# Patient Record
Sex: FEMALE | Race: WHITE | Employment: PART TIME
[De-identification: names, ages, dates, MRNs, and addresses within clinical notes are randomized per-mention and may not be internally consistent; named-entity substitution may affect disease eponyms.]

---

## 2017-09-07 ENCOUNTER — HOSPITAL ENCOUNTER (OUTPATIENT)
Dept: GENERAL RADIOLOGY | Facility: CLINIC | Age: 52
Discharge: HOME | End: 2017-09-07
Payer: COMMERCIAL

## 2017-09-07 ENCOUNTER — HOSPITAL ENCOUNTER (OUTPATIENT)
Facility: CLINIC | Age: 52
Discharge: HOME | End: 2017-09-07
Payer: COMMERCIAL

## 2017-09-07 DIAGNOSIS — R60.9 SWELLING: ICD-10-CM

## 2017-09-07 DIAGNOSIS — R52 PAIN: ICD-10-CM

## 2017-09-07 PROCEDURE — 73080 X-RAY EXAM OF ELBOW: CPT

## 2018-12-03 ENCOUNTER — HOSPITAL ENCOUNTER (OUTPATIENT)
Age: 53
Setting detail: SPECIMEN
Discharge: HOME | End: 2018-12-03
Payer: COMMERCIAL

## 2018-12-03 LAB
ANION GAP SERPL CALCULATED.3IONS-SCNC: 15 MMOL/L (ref 9–17)
CHLORIDE BLD-SCNC: 94 MMOL/L (ref 98–107)
CO2: 28 MMOL/L (ref 20–31)
GLUCOSE BLD-MCNC: 119 MG/DL (ref 70–99)
POTASSIUM SERPL-SCNC: 2.6 MMOL/L (ref 3.7–5.3)
SODIUM BLD-SCNC: 137 MMOL/L (ref 135–144)

## 2018-12-04 LAB
ESTIMATED AVERAGE GLUCOSE: 100 MG/DL
HBA1C MFR BLD: 5.1 % (ref 4–6)

## 2018-12-05 ENCOUNTER — HOSPITAL ENCOUNTER (OUTPATIENT)
Age: 53
Setting detail: SPECIMEN
Discharge: HOME | End: 2018-12-05
Payer: COMMERCIAL

## 2018-12-05 LAB
POTASSIUM SERPL-SCNC: 3.7 MMOL/L (ref 3.7–5.3)
SODIUM BLD-SCNC: 134 MMOL/L (ref 135–144)

## 2018-12-11 ENCOUNTER — HOSPITAL ENCOUNTER (OUTPATIENT)
Age: 53
Setting detail: SPECIMEN
Discharge: HOME | End: 2018-12-11
Payer: COMMERCIAL

## 2018-12-11 LAB
ANION GAP SERPL CALCULATED.3IONS-SCNC: 15 MMOL/L (ref 9–17)
CHLORIDE BLD-SCNC: 104 MMOL/L (ref 98–107)
CO2: 19 MMOL/L (ref 20–31)
POTASSIUM SERPL-SCNC: 4.5 MMOL/L (ref 3.7–5.3)
SODIUM BLD-SCNC: 138 MMOL/L (ref 135–144)

## 2018-12-13 LAB — VITAMIN D 25-HYDROXY: 18.7 NG/ML (ref 30–100)

## 2018-12-20 ENCOUNTER — HOSPITAL ENCOUNTER (OUTPATIENT)
Age: 53
Setting detail: SPECIMEN
Discharge: HOME | End: 2018-12-20
Payer: COMMERCIAL

## 2018-12-20 LAB
ABSOLUTE EOS #: 0.16 K/UL (ref 0–0.44)
ABSOLUTE IMMATURE GRANULOCYTE: 0.03 K/UL (ref 0–0.3)
ABSOLUTE LYMPH #: 0.96 K/UL (ref 1.1–3.7)
ABSOLUTE MONO #: 0.37 K/UL (ref 0.1–1.2)
BASOPHILS # BLD: 0 % (ref 0–2)
BASOPHILS ABSOLUTE: <0.03 K/UL (ref 0–0.2)
DIFFERENTIAL TYPE: ABNORMAL
EOSINOPHILS RELATIVE PERCENT: 3 % (ref 1–4)
HCT VFR BLD CALC: 34.2 % (ref 36.3–47.1)
HEMOGLOBIN: 11.2 G/DL (ref 11.9–15.1)
IMMATURE GRANULOCYTES: 1 %
LYMPHOCYTES # BLD: 16 % (ref 24–43)
MCH RBC QN AUTO: 29.9 PG (ref 25.2–33.5)
MCHC RBC AUTO-ENTMCNC: 32.7 G/DL (ref 28.4–34.8)
MCV RBC AUTO: 91.2 FL (ref 82.6–102.9)
MONOCYTES # BLD: 6 % (ref 3–12)
NRBC AUTOMATED: 0 PER 100 WBC
PDW BLD-RTO: 15.5 % (ref 11.8–14.4)
PLATELET # BLD: 140 K/UL (ref 138–453)
PLATELET ESTIMATE: ABNORMAL
PMV BLD AUTO: 11.9 FL (ref 8.1–13.5)
RBC # BLD: 3.75 M/UL (ref 3.95–5.11)
RBC # BLD: ABNORMAL 10*6/UL
SEG NEUTROPHILS: 74 % (ref 36–65)
SEGMENTED NEUTROPHILS ABSOLUTE COUNT: 4.48 K/UL (ref 1.5–8.1)
WBC # BLD: 6 K/UL (ref 3.5–11.3)
WBC # BLD: ABNORMAL 10*3/UL

## 2018-12-24 ENCOUNTER — HOSPITAL ENCOUNTER (OUTPATIENT)
Age: 53
Setting detail: SPECIMEN
Discharge: HOME | End: 2018-12-24
Payer: COMMERCIAL

## 2018-12-24 LAB
ABSOLUTE EOS #: 0.16 K/UL (ref 0–0.44)
ABSOLUTE IMMATURE GRANULOCYTE: 0.05 K/UL (ref 0–0.3)
ABSOLUTE LYMPH #: 1.18 K/UL (ref 1.1–3.7)
ABSOLUTE MONO #: 0.53 K/UL (ref 0.1–1.2)
BASOPHILS # BLD: 1 % (ref 0–2)
BASOPHILS ABSOLUTE: 0.06 K/UL (ref 0–0.2)
DIFFERENTIAL TYPE: ABNORMAL
EOSINOPHILS RELATIVE PERCENT: 2 % (ref 1–4)
HCT VFR BLD CALC: 37 % (ref 36.3–47.1)
HEMOGLOBIN: 11.2 G/DL (ref 11.9–15.1)
IMMATURE GRANULOCYTES: 1 %
LYMPHOCYTES # BLD: 17 % (ref 24–43)
MCH RBC QN AUTO: 29.6 PG (ref 25.2–33.5)
MCHC RBC AUTO-ENTMCNC: 30.3 G/DL (ref 28.4–34.8)
MCV RBC AUTO: 97.9 FL (ref 82.6–102.9)
MONOCYTES # BLD: 8 % (ref 3–12)
NRBC AUTOMATED: 0 PER 100 WBC
PDW BLD-RTO: 16 % (ref 11.8–14.4)
PLATELET # BLD: 121 K/UL (ref 138–453)
PLATELET ESTIMATE: ABNORMAL
PMV BLD AUTO: 11.8 FL (ref 8.1–13.5)
RBC # BLD: 3.78 M/UL (ref 3.95–5.11)
RBC # BLD: ABNORMAL 10*6/UL
SEG NEUTROPHILS: 71 % (ref 36–65)
SEGMENTED NEUTROPHILS ABSOLUTE COUNT: 4.94 K/UL (ref 1.5–8.1)
WBC # BLD: 6.9 K/UL (ref 3.5–11.3)
WBC # BLD: ABNORMAL 10*3/UL

## 2019-02-20 ENCOUNTER — HOSPITAL ENCOUNTER (OUTPATIENT)
Age: 54
Setting detail: SPECIMEN
Discharge: HOME | End: 2019-02-20
Payer: COMMERCIAL

## 2019-02-20 LAB
ESTRADIOL LEVEL: <5 PG/ML (ref 5–50)
FOLLICLE STIMULATING HORMONE: 56.8 U/L (ref 25.8–134.8)
LH: 28.1 U/L (ref 7.7–58.5)
T3 TOTAL: 82 NG/DL (ref 80–200)
T4 TOTAL: 9 UG/DL (ref 4.5–12)
TSH SERPL DL<=0.05 MIU/L-ACNC: 3.77 MIU/L (ref 0.3–5)

## 2019-02-24 LAB
ESTRADIOL LEVEL: 4.3 PG/ML
ESTROGEN TOTAL: 23 PG/ML
ESTRONE: 18.7 PG/ML

## 2019-06-17 ENCOUNTER — HOSPITAL ENCOUNTER (OUTPATIENT)
Age: 54
Setting detail: SPECIMEN
Discharge: HOME | End: 2019-06-17
Payer: COMMERCIAL

## 2019-06-17 LAB
CHOLESTEROL/HDL RATIO: 3.9
CHOLESTEROL: 159 MG/DL
HDLC SERPL-MCNC: 41 MG/DL
LDL CHOLESTEROL: 93 MG/DL (ref 0–130)
TRIGL SERPL-MCNC: 126 MG/DL
VLDLC SERPL CALC-MCNC: NORMAL MG/DL (ref 1–30)

## 2024-06-09 ENCOUNTER — HOSPITAL ENCOUNTER (EMERGENCY)
Age: 59
Discharge: HOME | End: 2024-06-09
Attending: EMERGENCY MEDICINE
Payer: COMMERCIAL

## 2024-06-09 ENCOUNTER — APPOINTMENT (OUTPATIENT)
Dept: GENERAL RADIOLOGY | Age: 59
End: 2024-06-09
Payer: COMMERCIAL

## 2024-06-09 VITALS
OXYGEN SATURATION: 97 % | TEMPERATURE: 97.8 F | RESPIRATION RATE: 18 BRPM | HEART RATE: 75 BPM | BODY MASS INDEX: 24.84 KG/M2 | DIASTOLIC BLOOD PRESSURE: 82 MMHG | WEIGHT: 135 LBS | SYSTOLIC BLOOD PRESSURE: 198 MMHG | HEIGHT: 62 IN

## 2024-06-09 DIAGNOSIS — S52.023S CLOSED FRACTURE OF OLECRANON PROCESS OF ULNA, UNSPECIFIED LATERALITY, SEQUELA: Primary | ICD-10-CM

## 2024-06-09 PROCEDURE — 73080 X-RAY EXAM OF ELBOW: CPT

## 2024-06-09 PROCEDURE — 73090 X-RAY EXAM OF FOREARM: CPT

## 2024-06-09 PROCEDURE — 29125 APPL SHORT ARM SPLINT STATIC: CPT

## 2024-06-09 PROCEDURE — 73110 X-RAY EXAM OF WRIST: CPT

## 2024-06-09 PROCEDURE — 99283 EMERGENCY DEPT VISIT LOW MDM: CPT | Performed by: EMERGENCY MEDICINE

## 2024-06-09 RX ORDER — HYDROCODONE BITARTRATE AND ACETAMINOPHEN 5; 325 MG/1; MG/1
1 TABLET ORAL EVERY 8 HOURS PRN
Qty: 9 TABLET | Refills: 0 | Status: ON HOLD | OUTPATIENT
Start: 2024-06-09 | End: 2024-06-12 | Stop reason: HOSPADM

## 2024-06-09 ASSESSMENT — PAIN SCALES - GENERAL: PAINLEVEL_OUTOF10: 6

## 2024-06-09 ASSESSMENT — PAIN - FUNCTIONAL ASSESSMENT: PAIN_FUNCTIONAL_ASSESSMENT: 0-10
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
## 2024-06-10 NOTE — ED NOTES
Message from [address] / telephone  Received: Today  Message Contents   Dom Oropeza [facility] Office Pool             General Message/Vendor Calls     Caller's first and last name: Jann Hart       Reason for call: Pt unable to get shingle shot as she just got first covid vaccine and also wants to have all prescriptions refilled.        Callback required yes/no and why:yes to discuss about shot       Best contact number(s): [phone]       Details to clarify the request:n/a       Lazaro Ramsey Per Dr. Russell he wanted to check patients splint prior to discharge. This was not done due to writer discharging patient before this was able to be done.     Patient did not elope, but  was discharged via wheelchair with all belongings, scripts and discharge paperwork. Follow up appointments and discharge instructions reviewed with pt and care giver. All question answered to satisfaction.  was discharged with affected arm splinted in 90 degree angle and a sling was placed to keep patient in 90 degree.      .Electronically signed by Christin Aguilar RN on 6/9/2024 at 10:00 pm.

## 2024-06-11 ENCOUNTER — OFFICE VISIT (OUTPATIENT)
Age: 59
End: 2024-06-11
Payer: COMMERCIAL

## 2024-06-11 ENCOUNTER — HOSPITAL ENCOUNTER (OUTPATIENT)
Age: 59
Discharge: HOME | End: 2024-06-11
Payer: COMMERCIAL

## 2024-06-11 ENCOUNTER — ANESTHESIA EVENT (OUTPATIENT)
Dept: OPERATING ROOM | Age: 59
End: 2024-06-11
Payer: COMMERCIAL

## 2024-06-11 VITALS — BODY MASS INDEX: 24.84 KG/M2 | WEIGHT: 135 LBS | HEIGHT: 62 IN

## 2024-06-11 DIAGNOSIS — S52.022A OLECRANON FRACTURE, LEFT, CLOSED, INITIAL ENCOUNTER: Primary | ICD-10-CM

## 2024-06-11 DIAGNOSIS — Z01.818 PRE-OP EXAM: ICD-10-CM

## 2024-06-11 DIAGNOSIS — Z01.818 PRE-OP EXAM: Primary | ICD-10-CM

## 2024-06-11 LAB
ANION GAP SERPL CALCULATED.3IONS-SCNC: 13 MMOL/L (ref 9–16)
BASOPHILS # BLD: 0.03 K/UL (ref 0–0.2)
BASOPHILS NFR BLD: 0 % (ref 0–2)
BUN SERPL-MCNC: 11 MG/DL (ref 8–23)
CALCIUM SERPL-MCNC: 8.7 MG/DL (ref 8.6–10.4)
CHLORIDE SERPL-SCNC: 107 MMOL/L (ref 98–107)
CO2 SERPL-SCNC: 21 MMOL/L (ref 20–31)
CREAT SERPL-MCNC: 0.8 MG/DL (ref 0.5–0.9)
EOSINOPHIL # BLD: 0.05 K/UL (ref 0–0.44)
EOSINOPHILS RELATIVE PERCENT: 1 % (ref 1–4)
ERYTHROCYTE [DISTWIDTH] IN BLOOD BY AUTOMATED COUNT: 14.6 % (ref 11.8–14.4)
GFR, ESTIMATED: 84 ML/MIN/1.73M2
GLUCOSE SERPL-MCNC: 99 MG/DL (ref 74–99)
HCT VFR BLD AUTO: 36.1 % (ref 36.3–47.1)
HGB BLD-MCNC: 12.4 G/DL (ref 11.9–15.1)
IMM GRANULOCYTES # BLD AUTO: 0.04 K/UL (ref 0–0.3)
IMM GRANULOCYTES NFR BLD: 1 %
LYMPHOCYTES NFR BLD: 1 K/UL (ref 1.1–3.7)
LYMPHOCYTES RELATIVE PERCENT: 13 % (ref 24–43)
MCH RBC QN AUTO: 30.6 PG (ref 25.2–33.5)
MCHC RBC AUTO-ENTMCNC: 34.3 G/DL (ref 28.4–34.8)
MCV RBC AUTO: 89.1 FL (ref 82.6–102.9)
MONOCYTES NFR BLD: 0.47 K/UL (ref 0.1–1.2)
MONOCYTES NFR BLD: 6 % (ref 3–12)
NEUTROPHILS NFR BLD: 79 % (ref 36–65)
NEUTS SEG NFR BLD: 6.31 K/UL (ref 1.5–8.1)
NRBC BLD-RTO: 0 PER 100 WBC
PLATELET # BLD AUTO: 163 K/UL (ref 138–453)
PMV BLD AUTO: 11.2 FL (ref 8.1–13.5)
POTASSIUM SERPL-SCNC: 3 MMOL/L (ref 3.7–5.3)
RBC # BLD AUTO: 4.05 M/UL (ref 3.95–5.11)
RBC # BLD: ABNORMAL 10*6/UL
SODIUM SERPL-SCNC: 141 MMOL/L (ref 136–145)
WBC OTHER # BLD: 7.9 K/UL (ref 3.5–11.3)

## 2024-06-11 PROCEDURE — 85025 COMPLETE CBC W/AUTO DIFF WBC: CPT

## 2024-06-11 PROCEDURE — 93005 ELECTROCARDIOGRAM TRACING: CPT | Performed by: ORTHOPAEDIC SURGERY

## 2024-06-11 PROCEDURE — 36415 COLL VENOUS BLD VENIPUNCTURE: CPT

## 2024-06-11 PROCEDURE — 80048 BASIC METABOLIC PNL TOTAL CA: CPT

## 2024-06-11 PROCEDURE — 99204 OFFICE O/P NEW MOD 45 MIN: CPT | Performed by: ORTHOPAEDIC SURGERY

## 2024-06-11 RX ORDER — ALPRAZOLAM 0.5 MG/1
TABLET ORAL
Status: ON HOLD | COMMUNITY
End: 2024-06-12 | Stop reason: HOSPADM

## 2024-06-11 RX ORDER — SODIUM CAPRYLATE
POWDER (GRAM) MISCELLANEOUS
COMMUNITY
Start: 2020-04-29

## 2024-06-11 RX ORDER — DOXYCYCLINE HYCLATE 100 MG/1
CAPSULE ORAL
COMMUNITY

## 2024-06-11 RX ORDER — SERTRALINE HYDROCHLORIDE 100 MG/1
TABLET, FILM COATED ORAL
COMMUNITY
Start: 2020-04-29

## 2024-06-11 RX ORDER — ASPIRIN 81 MG/1
1 TABLET ORAL DAILY
COMMUNITY

## 2024-06-11 RX ORDER — ALBUTEROL SULFATE 2.5 MG/3ML
SOLUTION RESPIRATORY (INHALATION)
COMMUNITY
Start: 2019-02-15

## 2024-06-11 RX ORDER — TIZANIDINE 4 MG/1
TABLET ORAL
Status: ON HOLD | COMMUNITY
End: 2024-06-12 | Stop reason: HOSPADM

## 2024-06-11 RX ORDER — LEVOTHYROXINE SODIUM 0.03 MG/1
TABLET ORAL
COMMUNITY

## 2024-06-11 RX ORDER — NIFEDIPINE 10 MG/1
CAPSULE ORAL
COMMUNITY
Start: 2019-10-24

## 2024-06-11 RX ORDER — ATORVASTATIN CALCIUM 80 MG/1
1 TABLET, FILM COATED ORAL DAILY
COMMUNITY
Start: 2019-02-26

## 2024-06-11 RX ORDER — TRAZODONE HYDROCHLORIDE 50 MG/1
TABLET ORAL
Status: ON HOLD | COMMUNITY
End: 2024-06-12 | Stop reason: HOSPADM

## 2024-06-11 RX ORDER — IBUPROFEN 800 MG/1
TABLET ORAL
Status: ON HOLD | COMMUNITY
End: 2024-06-12 | Stop reason: HOSPADM

## 2024-06-11 RX ORDER — MELOXICAM 15 MG/1
TABLET ORAL
Status: ON HOLD | COMMUNITY
End: 2024-06-12 | Stop reason: HOSPADM

## 2024-06-11 RX ORDER — POTASSIUM CHLORIDE 750 MG/1
TABLET, FILM COATED, EXTENDED RELEASE ORAL
COMMUNITY
Start: 2024-05-20

## 2024-06-11 RX ORDER — B-COMPLEX WITH VITAMIN C
50 TABLET ORAL
COMMUNITY
Start: 2020-04-29

## 2024-06-11 RX ORDER — QUETIAPINE FUMARATE 50 MG/1
TABLET, EXTENDED RELEASE ORAL
COMMUNITY
Start: 2021-12-30

## 2024-06-11 RX ORDER — AMLODIPINE BESYLATE 2.5 MG/1
TABLET ORAL
COMMUNITY
Start: 2024-05-23

## 2024-06-11 RX ORDER — HYDROXYCHLOROQUINE SULFATE 200 MG/1
TABLET, FILM COATED ORAL
COMMUNITY
Start: 2019-10-24

## 2024-06-11 RX ORDER — QUETIAPINE FUMARATE 25 MG/1
TABLET, FILM COATED ORAL
COMMUNITY

## 2024-06-11 RX ORDER — HYDROXYZINE HYDROCHLORIDE 25 MG/1
25 TABLET, FILM COATED ORAL
COMMUNITY
Start: 2019-10-24
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
## 2024-06-12 ENCOUNTER — APPOINTMENT (OUTPATIENT)
Dept: GENERAL RADIOLOGY | Age: 59
End: 2024-06-12
Attending: ORTHOPAEDIC SURGERY
Payer: COMMERCIAL

## 2024-06-12 ENCOUNTER — HOSPITAL ENCOUNTER (OUTPATIENT)
Age: 59
Setting detail: OUTPATIENT SURGERY
Discharge: HOME | End: 2024-06-12
Attending: ORTHOPAEDIC SURGERY | Admitting: ORTHOPAEDIC SURGERY
Payer: COMMERCIAL

## 2024-06-12 ENCOUNTER — ANESTHESIA (OUTPATIENT)
Dept: OPERATING ROOM | Age: 59
End: 2024-06-12
Payer: COMMERCIAL

## 2024-06-12 VITALS
HEART RATE: 71 BPM | TEMPERATURE: 97.2 F | RESPIRATION RATE: 16 BRPM | WEIGHT: 130 LBS | HEIGHT: 62 IN | OXYGEN SATURATION: 93 % | BODY MASS INDEX: 23.92 KG/M2 | DIASTOLIC BLOOD PRESSURE: 63 MMHG | SYSTOLIC BLOOD PRESSURE: 150 MMHG

## 2024-06-12 DIAGNOSIS — G89.18 POST-OP PAIN: Primary | ICD-10-CM

## 2024-06-12 LAB — POTASSIUM SERPL-SCNC: 3.5 MMOL/L (ref 3.7–5.3)

## 2024-06-12 PROCEDURE — 24685 OPTX ULNAR FX PROX END W/FIX: CPT | Performed by: NURSE PRACTITIONER

## 2024-06-12 PROCEDURE — 6360000002 HC RX W HCPCS: Performed by: NURSE PRACTITIONER

## 2024-06-12 PROCEDURE — 2500000003 HC RX 250 WO HCPCS: Performed by: REGISTERED NURSE

## 2024-06-12 PROCEDURE — 3700000001 HC ADD 15 MINUTES (ANESTHESIA): Performed by: ORTHOPAEDIC SURGERY

## 2024-06-12 PROCEDURE — 2500000003 HC RX 250 WO HCPCS: Performed by: ANESTHESIOLOGY

## 2024-06-12 PROCEDURE — 24685 OPTX ULNAR FX PROX END W/FIX: CPT | Performed by: ORTHOPAEDIC SURGERY

## 2024-06-12 PROCEDURE — 6360000002 HC RX W HCPCS

## 2024-06-12 PROCEDURE — 64415 NJX AA&/STRD BRCH PLXS IMG: CPT | Performed by: ANESTHESIOLOGY

## 2024-06-12 PROCEDURE — 7100000001 HC PACU RECOVERY - ADDTL 15 MIN: Performed by: ORTHOPAEDIC SURGERY

## 2024-06-12 PROCEDURE — 7100000011 HC PHASE II RECOVERY - ADDTL 15 MIN: Performed by: ORTHOPAEDIC SURGERY

## 2024-06-12 PROCEDURE — C1713 ANCHOR/SCREW BN/BN,TIS/BN: HCPCS | Performed by: ORTHOPAEDIC SURGERY

## 2024-06-12 PROCEDURE — 6370000000 HC RX 637 (ALT 250 FOR IP): Performed by: REGISTERED NURSE

## 2024-06-12 PROCEDURE — 3700000000 HC ANESTHESIA ATTENDED CARE: Performed by: ORTHOPAEDIC SURGERY

## 2024-06-12 PROCEDURE — 36415 COLL VENOUS BLD VENIPUNCTURE: CPT

## 2024-06-12 PROCEDURE — 7100000000 HC PACU RECOVERY - FIRST 15 MIN: Performed by: ORTHOPAEDIC SURGERY

## 2024-06-12 PROCEDURE — 7100000010 HC PHASE II RECOVERY - FIRST 15 MIN: Performed by: ORTHOPAEDIC SURGERY

## 2024-06-12 PROCEDURE — 3600000014 HC SURGERY LEVEL 4 ADDTL 15MIN: Performed by: ORTHOPAEDIC SURGERY

## 2024-06-12 PROCEDURE — 2580000003 HC RX 258: Performed by: NURSE PRACTITIONER

## 2024-06-12 PROCEDURE — 6370000000 HC RX 637 (ALT 250 FOR IP)

## 2024-06-12 PROCEDURE — 3600000004 HC SURGERY LEVEL 4 BASE: Performed by: ORTHOPAEDIC SURGERY

## 2024-06-12 PROCEDURE — 6360000002 HC RX W HCPCS: Performed by: REGISTERED NURSE

## 2024-06-12 PROCEDURE — 6360000002 HC RX W HCPCS: Performed by: ANESTHESIOLOGY

## 2024-06-12 PROCEDURE — 2580000003 HC RX 258: Performed by: STUDENT IN AN ORGANIZED HEALTH CARE EDUCATION/TRAINING PROGRAM

## 2024-06-12 PROCEDURE — 2709999900 HC NON-CHARGEABLE SUPPLY: Performed by: ORTHOPAEDIC SURGERY

## 2024-06-12 PROCEDURE — 84132 ASSAY OF SERUM POTASSIUM: CPT

## 2024-06-12 DEVICE — SCREW BNE L20MM DIA2.7MM ANK S STL ST VAR ANG LOK FULL THRD: Type: IMPLANTABLE DEVICE | Site: ELBOW | Status: FUNCTIONAL

## 2024-06-12 DEVICE — K WIRE FIX L150MM DIA1.25MM S STL TRCR PNT: Type: IMPLANTABLE DEVICE | Site: ELBOW | Status: FUNCTIONAL

## 2024-06-12 DEVICE — SCREW BNE L36MM DIA3.5MM CORT S STL ST NONCANNULATED LOK: Type: IMPLANTABLE DEVICE | Site: ELBOW | Status: FUNCTIONAL

## 2024-06-12 DEVICE — SCREW BNE L44MM DIA2.7MM ANK S STL ST VAR ANG LOK FULL THRD: Type: IMPLANTABLE DEVICE | Site: ELBOW | Status: FUNCTIONAL

## 2024-06-12 DEVICE — IMPLANTABLE DEVICE: Type: IMPLANTABLE DEVICE | Site: ELBOW | Status: FUNCTIONAL

## 2024-06-12 DEVICE — SCREW BNE L16MM DIA3.5MM CORT S STL ST LOK FULL THRD: Type: IMPLANTABLE DEVICE | Site: ELBOW | Status: FUNCTIONAL

## 2024-06-12 DEVICE — SCREW BNE L22MM DIA3.5MM CORT S STL ST NONCANNULATED LOK: Type: IMPLANTABLE DEVICE | Site: ELBOW | Status: FUNCTIONAL

## 2024-06-12 RX ORDER — LIDOCAINE HYDROCHLORIDE 10 MG/ML
INJECTION, SOLUTION INFILTRATION; PERINEURAL PRN
Status: DISCONTINUED | OUTPATIENT
Start: 2024-06-12 | End: 2024-06-12 | Stop reason: SDUPTHER

## 2024-06-12 RX ORDER — DIPHENHYDRAMINE HYDROCHLORIDE 50 MG/ML
12.5 INJECTION INTRAMUSCULAR; INTRAVENOUS
Status: DISCONTINUED | OUTPATIENT
Start: 2024-06-12 | End: 2024-06-12 | Stop reason: HOSPADM

## 2024-06-12 RX ORDER — OXYCODONE HYDROCHLORIDE AND ACETAMINOPHEN 5; 325 MG/1; MG/1
TABLET ORAL
Status: COMPLETED
Start: 2024-06-12 | End: 2024-06-12

## 2024-06-12 RX ORDER — FENTANYL CITRATE 50 UG/ML
INJECTION, SOLUTION INTRAMUSCULAR; INTRAVENOUS PRN
Status: DISCONTINUED | OUTPATIENT
Start: 2024-06-12 | End: 2024-06-12 | Stop reason: SDUPTHER

## 2024-06-12 RX ORDER — GLYCOPYRROLATE 1 MG/5 ML
SYRINGE (ML) INTRAVENOUS PRN
Status: DISCONTINUED | OUTPATIENT
Start: 2024-06-12 | End: 2024-06-12 | Stop reason: SDUPTHER

## 2024-06-12 RX ORDER — ONDANSETRON 2 MG/ML
4 INJECTION INTRAMUSCULAR; INTRAVENOUS
Status: DISCONTINUED | OUTPATIENT
Start: 2024-06-12 | End: 2024-06-12 | Stop reason: HOSPADM

## 2024-06-12 RX ORDER — MORPHINE SULFATE 2 MG/ML
2 INJECTION, SOLUTION INTRAMUSCULAR; INTRAVENOUS EVERY 5 MIN PRN
Status: DISCONTINUED | OUTPATIENT
Start: 2024-06-12 | End: 2024-06-12 | Stop reason: HOSPADM

## 2024-06-12 RX ORDER — ACETAMINOPHEN 500 MG
TABLET ORAL
Status: COMPLETED
Start: 2024-06-12 | End: 2024-06-12

## 2024-06-12 RX ORDER — LIDOCAINE HYDROCHLORIDE 10 MG/ML
1 INJECTION, SOLUTION EPIDURAL; INFILTRATION; INTRACAUDAL; PERINEURAL
Status: DISCONTINUED | OUTPATIENT
Start: 2024-06-12 | End: 2024-06-12 | Stop reason: HOSPADM

## 2024-06-12 RX ORDER — OXYCODONE HYDROCHLORIDE AND ACETAMINOPHEN 5; 325 MG/1; MG/1
1-2 TABLET ORAL EVERY 6 HOURS PRN
Qty: 20 TABLET | Refills: 0 | Status: SHIPPED | OUTPATIENT
Start: 2024-06-12 | End: 2024-06-19

## 2024-06-12 RX ORDER — SODIUM CHLORIDE 0.9 % (FLUSH) 0.9 %
5-40 SYRINGE (ML) INJECTION PRN
Status: DISCONTINUED | OUTPATIENT
Start: 2024-06-12 | End: 2024-06-12 | Stop reason: HOSPADM

## 2024-06-12 RX ORDER — OXYCODONE HYDROCHLORIDE AND ACETAMINOPHEN 5; 325 MG/1; MG/1
1 TABLET ORAL
Status: COMPLETED | OUTPATIENT
Start: 2024-06-12 | End: 2024-06-12

## 2024-06-12 RX ORDER — CEFAZOLIN 2 G/1
INJECTION, POWDER, FOR SOLUTION INTRAMUSCULAR; INTRAVENOUS
Status: DISCONTINUED
Start: 2024-06-12 | End: 2024-06-12 | Stop reason: HOSPADM

## 2024-06-12 RX ORDER — HYDRALAZINE HYDROCHLORIDE 20 MG/ML
10 INJECTION INTRAMUSCULAR; INTRAVENOUS
Status: DISCONTINUED | OUTPATIENT
Start: 2024-06-12 | End: 2024-06-12 | Stop reason: HOSPADM

## 2024-06-12 RX ORDER — LABETALOL HYDROCHLORIDE 5 MG/ML
10 INJECTION, SOLUTION INTRAVENOUS
Status: DISCONTINUED | OUTPATIENT
Start: 2024-06-12 | End: 2024-06-12 | Stop reason: HOSPADM

## 2024-06-12 RX ORDER — SODIUM CHLORIDE, SODIUM LACTATE, POTASSIUM CHLORIDE, CALCIUM CHLORIDE 600; 310; 30; 20 MG/100ML; MG/100ML; MG/100ML; MG/100ML
INJECTION, SOLUTION INTRAVENOUS CONTINUOUS
Status: DISCONTINUED | OUTPATIENT
Start: 2024-06-12 | End: 2024-06-12 | Stop reason: HOSPADM

## 2024-06-12 RX ORDER — MEPERIDINE HYDROCHLORIDE 50 MG/ML
12.5 INJECTION INTRAMUSCULAR; INTRAVENOUS; SUBCUTANEOUS EVERY 5 MIN PRN
Status: DISCONTINUED | OUTPATIENT
Start: 2024-06-12 | End: 2024-06-12 | Stop reason: HOSPADM

## 2024-06-12 RX ORDER — MORPHINE SULFATE 2 MG/ML
INJECTION, SOLUTION INTRAMUSCULAR; INTRAVENOUS
Status: COMPLETED
Start: 2024-06-12 | End: 2024-06-12

## 2024-06-12 RX ORDER — ROCURONIUM BROMIDE 10 MG/ML
INJECTION, SOLUTION INTRAVENOUS PRN
Status: DISCONTINUED | OUTPATIENT
Start: 2024-06-12 | End: 2024-06-12 | Stop reason: SDUPTHER

## 2024-06-12 RX ORDER — DEXAMETHASONE SODIUM PHOSPHATE 10 MG/ML
10 INJECTION, SOLUTION INTRAMUSCULAR; INTRAVENOUS ONCE
Status: DISCONTINUED | OUTPATIENT
Start: 2024-06-12 | End: 2024-06-12 | Stop reason: HOSPADM

## 2024-06-12 RX ORDER — SODIUM CHLORIDE 9 MG/ML
INJECTION, SOLUTION INTRAVENOUS PRN
Status: DISCONTINUED | OUTPATIENT
Start: 2024-06-12 | End: 2024-06-12 | Stop reason: HOSPADM

## 2024-06-12 RX ORDER — FENTANYL CITRATE 50 UG/ML
100 INJECTION, SOLUTION INTRAMUSCULAR; INTRAVENOUS
Status: COMPLETED | OUTPATIENT
Start: 2024-06-12 | End: 2024-06-12

## 2024-06-12 RX ORDER — MIDAZOLAM HYDROCHLORIDE 1 MG/ML
INJECTION INTRAMUSCULAR; INTRAVENOUS
Status: COMPLETED
Start: 2024-06-12 | End: 2024-06-12

## 2024-06-12 RX ORDER — SODIUM CHLORIDE 0.9 % (FLUSH) 0.9 %
5-40 SYRINGE (ML) INJECTION EVERY 12 HOURS SCHEDULED
Status: DISCONTINUED | OUTPATIENT
Start: 2024-06-12 | End: 2024-06-12 | Stop reason: HOSPADM

## 2024-06-12 RX ORDER — ALBUTEROL SULFATE 90 UG/1
AEROSOL, METERED RESPIRATORY (INHALATION) PRN
Status: DISCONTINUED | OUTPATIENT
Start: 2024-06-12 | End: 2024-06-12 | Stop reason: SDUPTHER

## 2024-06-12 RX ORDER — POTASSIUM CHLORIDE 20 MEQ/1
40 TABLET, EXTENDED RELEASE ORAL DAILY
Qty: 2 TABLET | Refills: 0 | Status: SHIPPED | OUTPATIENT
Start: 2024-06-12 | End: 2024-06-13

## 2024-06-12 RX ORDER — METOCLOPRAMIDE HYDROCHLORIDE 5 MG/ML
10 INJECTION INTRAMUSCULAR; INTRAVENOUS
Status: DISCONTINUED | OUTPATIENT
Start: 2024-06-12 | End: 2024-06-12 | Stop reason: HOSPADM

## 2024-06-12 RX ORDER — KETOROLAC TROMETHAMINE 30 MG/ML
INJECTION, SOLUTION INTRAMUSCULAR; INTRAVENOUS PRN
Status: DISCONTINUED | OUTPATIENT
Start: 2024-06-12 | End: 2024-06-12 | Stop reason: SDUPTHER

## 2024-06-12 RX ORDER — OXYCODONE HYDROCHLORIDE AND ACETAMINOPHEN 5; 325 MG/1; MG/1
2 TABLET ORAL
Status: DISCONTINUED | OUTPATIENT
Start: 2024-06-12 | End: 2024-06-12 | Stop reason: HOSPADM

## 2024-06-12 RX ORDER — ONDANSETRON 2 MG/ML
INJECTION INTRAMUSCULAR; INTRAVENOUS PRN
Status: DISCONTINUED | OUTPATIENT
Start: 2024-06-12 | End: 2024-06-12 | Stop reason: SDUPTHER

## 2024-06-12 RX ORDER — LIDOCAINE HYDROCHLORIDE 20 MG/ML
INJECTION, SOLUTION INFILTRATION; PERINEURAL
Status: COMPLETED | OUTPATIENT
Start: 2024-06-12 | End: 2024-06-12

## 2024-06-12 RX ORDER — MIDAZOLAM HYDROCHLORIDE 2 MG/2ML
2 INJECTION, SOLUTION INTRAMUSCULAR; INTRAVENOUS
Status: COMPLETED | OUTPATIENT
Start: 2024-06-12 | End: 2024-06-12

## 2024-06-12 RX ORDER — FENTANYL CITRATE 50 UG/ML
100 INJECTION, SOLUTION INTRAMUSCULAR; INTRAVENOUS ONCE
Status: CANCELLED | OUTPATIENT
Start: 2024-06-12 | End: 2024-06-12

## 2024-06-12 RX ORDER — ROPIVACAINE HYDROCHLORIDE 5 MG/ML
INJECTION, SOLUTION EPIDURAL; INFILTRATION; PERINEURAL
Status: COMPLETED | OUTPATIENT
Start: 2024-06-12 | End: 2024-06-12

## 2024-06-12 RX ORDER — MIDAZOLAM HYDROCHLORIDE 2 MG/2ML
2 INJECTION, SOLUTION INTRAMUSCULAR; INTRAVENOUS
Status: DISCONTINUED | OUTPATIENT
Start: 2024-06-12 | End: 2024-06-12 | Stop reason: HOSPADM

## 2024-06-12 RX ORDER — GLYCOPYRROLATE 0.2 MG/ML
0.4 INJECTION INTRAMUSCULAR; INTRAVENOUS ONCE
Status: DISCONTINUED | OUTPATIENT
Start: 2024-06-12 | End: 2024-06-12 | Stop reason: HOSPADM

## 2024-06-12 RX ORDER — DEXAMETHASONE SODIUM PHOSPHATE 10 MG/ML
INJECTION, SOLUTION INTRAMUSCULAR; INTRAVENOUS PRN
Status: DISCONTINUED | OUTPATIENT
Start: 2024-06-12 | End: 2024-06-12 | Stop reason: SDUPTHER

## 2024-06-12 RX ORDER — ACETAMINOPHEN 500 MG
1000 TABLET ORAL ONCE
Status: COMPLETED | OUTPATIENT
Start: 2024-06-12 | End: 2024-06-12

## 2024-06-12 RX ORDER — IPRATROPIUM BROMIDE AND ALBUTEROL SULFATE 2.5; .5 MG/3ML; MG/3ML
1 SOLUTION RESPIRATORY (INHALATION)
Status: DISCONTINUED | OUTPATIENT
Start: 2024-06-12 | End: 2024-06-12 | Stop reason: HOSPADM

## 2024-06-12 RX ORDER — IBUPROFEN 800 MG/1
800 TABLET ORAL
Qty: 90 TABLET | Refills: 0 | Status: SHIPPED | OUTPATIENT
Start: 2024-06-12

## 2024-06-12 RX ORDER — MIDAZOLAM HYDROCHLORIDE 2 MG/2ML
2 INJECTION, SOLUTION INTRAMUSCULAR; INTRAVENOUS ONCE
Status: CANCELLED | OUTPATIENT
Start: 2024-06-12 | End: 2024-06-12

## 2024-06-12 RX ORDER — PHENYLEPHRINE HCL IN 0.9% NACL 1 MG/10 ML
SYRINGE (ML) INTRAVENOUS PRN
Status: DISCONTINUED | OUTPATIENT
Start: 2024-06-12 | End: 2024-06-12 | Stop reason: SDUPTHER

## 2024-06-12 RX ORDER — PROPOFOL 10 MG/ML
INJECTION, EMULSION INTRAVENOUS PRN
Status: DISCONTINUED | OUTPATIENT
Start: 2024-06-12 | End: 2024-06-12 | Stop reason: SDUPTHER

## 2024-06-12 RX ORDER — FENTANYL CITRATE 50 UG/ML
INJECTION, SOLUTION INTRAMUSCULAR; INTRAVENOUS
Status: COMPLETED
Start: 2024-06-12 | End: 2024-06-12

## 2024-06-12 RX ORDER — NALOXONE HYDROCHLORIDE 0.4 MG/ML
INJECTION, SOLUTION INTRAMUSCULAR; INTRAVENOUS; SUBCUTANEOUS PRN
Status: DISCONTINUED | OUTPATIENT
Start: 2024-06-12 | End: 2024-06-12 | Stop reason: HOSPADM

## 2024-06-12 RX ADMIN — PROPOFOL 150 MG: 10 INJECTION, EMULSION INTRAVENOUS at 14:47

## 2024-06-12 RX ADMIN — LIDOCAINE HYDROCHLORIDE 10 ML: 20 INJECTION, SOLUTION INFILTRATION; PERINEURAL at 14:19

## 2024-06-12 RX ADMIN — ROCURONIUM BROMIDE 40 MG: 10 INJECTION, SOLUTION INTRAVENOUS at 14:47

## 2024-06-12 RX ADMIN — SODIUM CHLORIDE, POTASSIUM CHLORIDE, SODIUM LACTATE AND CALCIUM CHLORIDE: 600; 310; 30; 20 INJECTION, SOLUTION INTRAVENOUS at 15:54

## 2024-06-12 RX ADMIN — FENTANYL CITRATE 100 MCG: 50 INJECTION, SOLUTION INTRAMUSCULAR; INTRAVENOUS at 14:15

## 2024-06-12 RX ADMIN — Medication 200 MCG: at 15:05

## 2024-06-12 RX ADMIN — Medication 200 MCG: at 15:38

## 2024-06-12 RX ADMIN — Medication 0.3 MG: at 16:12

## 2024-06-12 RX ADMIN — MORPHINE SULFATE 2 MG: 2 INJECTION, SOLUTION INTRAMUSCULAR; INTRAVENOUS at 16:35

## 2024-06-12 RX ADMIN — Medication 100 MCG: at 15:14

## 2024-06-12 RX ADMIN — ACETAMINOPHEN 1000 MG: 500 TABLET ORAL at 13:12

## 2024-06-12 RX ADMIN — ROPIVACAINE HYDROCHLORIDE 10 ML: 5 INJECTION, SOLUTION EPIDURAL; INFILTRATION; PERINEURAL at 14:19

## 2024-06-12 RX ADMIN — ALBUTEROL SULFATE 4 PUFF: 90 AEROSOL, METERED RESPIRATORY (INHALATION) at 15:47

## 2024-06-12 RX ADMIN — FENTANYL CITRATE 100 MCG: 50 INJECTION, SOLUTION INTRAMUSCULAR; INTRAVENOUS at 15:54

## 2024-06-12 RX ADMIN — Medication 200 MCG: at 15:29

## 2024-06-12 RX ADMIN — ONDANSETRON 4 MG: 2 INJECTION INTRAMUSCULAR; INTRAVENOUS at 15:47

## 2024-06-12 RX ADMIN — OXYCODONE HYDROCHLORIDE AND ACETAMINOPHEN 1 TABLET: 5; 325 TABLET ORAL at 17:08

## 2024-06-12 RX ADMIN — SODIUM CHLORIDE, POTASSIUM CHLORIDE, SODIUM LACTATE AND CALCIUM CHLORIDE: 600; 310; 30; 20 INJECTION, SOLUTION INTRAVENOUS at 14:08

## 2024-06-12 RX ADMIN — MIDAZOLAM HYDROCHLORIDE 2 MG: 2 INJECTION, SOLUTION INTRAMUSCULAR; INTRAVENOUS at 14:15

## 2024-06-12 RX ADMIN — MIDAZOLAM HYDROCHLORIDE 2 MG: 1 INJECTION, SOLUTION INTRAMUSCULAR; INTRAVENOUS at 14:15

## 2024-06-12 RX ADMIN — DEXAMETHASONE SODIUM PHOSPHATE 10 MG: 10 INJECTION INTRAMUSCULAR; INTRAVENOUS at 15:00

## 2024-06-12 RX ADMIN — SUGAMMADEX 200 MG: 100 INJECTION, SOLUTION INTRAVENOUS at 15:58

## 2024-06-12 RX ADMIN — ROCURONIUM BROMIDE 10 MG: 10 INJECTION, SOLUTION INTRAVENOUS at 15:15

## 2024-06-12 RX ADMIN — Medication 1000 MG: at 13:12

## 2024-06-12 RX ADMIN — CEFAZOLIN 2000 MG: 2 INJECTION, POWDER, FOR SOLUTION INTRAMUSCULAR; INTRAVENOUS at 14:58

## 2024-06-12 RX ADMIN — LIDOCAINE HYDROCHLORIDE 40 MG: 10 INJECTION, SOLUTION INFILTRATION; PERINEURAL at 14:47

## 2024-06-12 RX ADMIN — KETOROLAC TROMETHAMINE 30 MG: 30 INJECTION, SOLUTION INTRAMUSCULAR; INTRAVENOUS at 15:47

## 2024-06-12 ASSESSMENT — PAIN SCALES - GENERAL
PAINLEVEL_OUTOF10: 3
PAINLEVEL_OUTOF10: 5

## 2024-06-12 ASSESSMENT — PAIN - FUNCTIONAL ASSESSMENT
PAIN_FUNCTIONAL_ASSESSMENT: NONE - DENIES PAIN
PAIN_FUNCTIONAL_ASSESSMENT: 0-10

## 2024-06-12 ASSESSMENT — PAIN DESCRIPTION - LOCATION
LOCATION: ARM

## 2024-06-12 ASSESSMENT — PAIN DESCRIPTION - DESCRIPTORS
DESCRIPTORS: ACHING

## 2024-06-12 ASSESSMENT — PAIN DESCRIPTION - ORIENTATION
ORIENTATION: LEFT

## 2024-06-12 ASSESSMENT — LIFESTYLE VARIABLES: SMOKING_STATUS: 1

## 2024-06-12 NOTE — ANESTHESIA PRE PROCEDURE
5-40 mL IntraVENous PRN Sandy Solomon MD       • 0.9 % sodium chloride infusion   IntraVENous PRN Sandy Solomon MD       • midazolam PF (VERSED) injection 2 mg  2 mg IntraVENous Once PRN Sandy Solomon MD       • ceFAZolin (ANCEF) 2 g injection            • midazolam (VERSED) 2 MG/2ML injection            • fentaNYL (SUBLIMAZE) 100 MCG/2ML injection            • BUPivacaine liposome (EXPAREL) 1.3 % injection                Allergies:    Allergies   Allergen Reactions   • Latex Other (See Comments) and Rash     Other reaction(s): Rash   • Meperidine Nausea And Vomiting     Other reaction(s): vomiting   Other reaction(s): Vomiting   • Meperidine Hcl Nausea And Vomiting       Problem List:  There is no problem list on file for this patient.      Past Medical History:        Diagnosis Date   • ASHD (arteriosclerotic heart disease)    • CHF (congestive heart failure) (HCC)     chronic   • Hyperlipidemia    • Hypertension    • Pericardial effusion 2022    and 2019- small   • Raynaud disease    • Thyroid disease        Past Surgical History:        Procedure Laterality Date   • CARDIAC CATHETERIZATION     • FINGER SURGERY  2019       Social History:    Social History     Tobacco Use   • Smoking status: Every Day     Types: Cigarettes   • Smokeless tobacco: Never   Substance Use Topics   • Alcohol use: Not on file                                Ready to quit: Not Answered  Counseling given: Not Answered      Vital Signs (Current):   Vitals:    06/12/24 1307   Weight: 59 kg (130 lb)   Height: 1.575 m (5' 2.01\")                                              BP Readings from Last 3 Encounters:   06/09/24 (!) 198/82       NPO Status: Time of last liquid consumption: 1000                        Time of last solid consumption: 1900                        Date of last liquid consumption: 06/12/24                        Date of last solid food consumption: 06/11/24    BMI:   Wt Readings from Last 3 Encounters:   06/12/24 59 kg

## 2024-06-12 NOTE — OP NOTE
present  Other Findings:     Detailed Description of Procedure:   Informed consent was obtained.  I marked her left elbow.  She was brought back to the operating room after receiving a regional nerve block.  General anesthesia was smoothly induced.  The left arm was prepped and draped in normal sterile fashion.  A timeout is performed.  She received prophylactic antibiotics.  The limb was exsanguinated with an Esmarch bandage and a tourniquet was inflated to 175 mmHg.  I made a curvilinear incision over the olecranon through the skin and subcutaneous tissue.  There was no big fracture gap or fracture hematoma.  I used a pointed reduction clamp to help close down the fracture gap and then placed 2 K wires to pin the fracture.  I chose the appropriate plate.  I split the triceps so the plate could sit more directly on the bone.  I placed a distal cortical screw to hold the plate down on bone.  I took x-rays to confirm fracture reduction and hardware placement.  I placed 2 long locking screws from proximal to distal across the fracture.  I placed 1 more cortical screw distally and 1 more locking screw distally.  One of the distal cortical screws did not have a great bite and changes for a locking screw.  There was another screw that was too long that was changed to a shorter screw.  I took final x-rays to confirm fracture reduction and hardware placement.  I irrigated with normal saline.  I was able to take the arm through full flexion extension pronation and supination without any fracture gapping or impingement.  The triceps split was closed with Vicryl suture.  The subcutaneous tissue was closed with Vicryl and the skin was closed with Monocryl and skin glue.  A sterile dry dressing was applied.  The tourniquet was released she had brisk cap refill.  Sponge and needle counts were correct.    Postoperative plan: She will be discharged home.  Sling is for comfort.  She can start active range of motion of the elbow as  tolerated, no lifting more than a couple pounds with that arm.  She can have a daily bandage change starting in 3 to 4 days.  I will see her in 2 weeks.  Percocet and ibuprofen for pain control.    Alma Perdomo CNP was a first assistant for this case and was directly involved with each stage of the procedure including positioning, assistance during the procedure, implant placement if needed, and closure if needed.  There was no orthopedic resident available to assist.      Electronically signed by Riki Sotelo MD on 6/12/2024 at 4:22 PM

## 2024-06-12 NOTE — DISCHARGE INSTRUCTIONS
Sling as needed for comfort & while nerve block is working;  Okay for gentle range of motion of the elbow as tolerated but no lifting, pulling or pushing with the left arm. Leave the surgical bandage in place for 3-5 days.  Then you can remove it and shower and replace with a new bandage or a large Band-Aid each day.     Call your doctor now or seek immediate medical care if:     You have pain that does not get better after you take pain medicine.   You have a fever over 101°F.   You have chills   You have signs of infection, such as:   Increased pain, swelling, warmth, or redness.   Red streaks leading from the incision.   Pus draining from the incision.       Use Incentive Spirometer 10x every 2 hours while awake    Activity  You have had anesthesia today  Do not drive, operate heavy equipment, consume alcoholic beverages, or make any important decisions  for 24 hours   If you are taking pain medication: Do not drive or consume alcohol.  Take your time changing positions today. You may feel light headed or dizzy if you move too quickly.   Continue your home medications as ordered by your physician.  Diet   You can eat your normal diet when you feel well. You should start off with bland foods like chicken soup, toast, or yogurt. Then advance as tolerated.  Drink plenty of fluids (unless your doctor tells you not to). Your urine should be very lightly colored without a strong odor.

## 2024-06-12 NOTE — ANESTHESIA PROCEDURE NOTES
Peripheral Block    Patient location during procedure: pre-op  Reason for block: post-op pain management and at surgeon's request  Start time: 6/12/2024 2:17 PM  End time: 6/12/2024 2:19 PM  Staffing  Performed: anesthesiologist   Anesthesiologist: Tima Zhao MD  Performed by: Tima Zhao MD  Authorized by: Tima Zhao MD    Preanesthetic Checklist  Completed: patient identified, IV checked, site marked, risks and benefits discussed, surgical/procedural consents, equipment checked, pre-op evaluation, timeout performed, anesthesia consent given, oxygen available, monitors applied/VS acknowledged, fire risk safety assessment completed and verbalized and blood product R/B/A discussed and consented  Peripheral Block   Patient position: sitting  Prep: ChloraPrep  Provider prep: mask  Patient monitoring: cardiac monitor, continuous pulse ox, frequent blood pressure checks, IV access, oxygen and responsive to questions  Block type: Brachial plexus  Supraclavicular  Laterality: left  Injection technique: single-shot  Guidance: ultrasound guided    Needle   Needle type: insulated echogenic nerve stimulator needle   Needle gauge: 22 G  Needle localization: anatomical landmarks and ultrasound guidance  Needle length: 2 IN.  Assessment   Injection assessment: negative aspiration for heme, no paresthesia on injection, local visualized surrounding nerve on ultrasound and no intravascular symptoms  Paresthesia pain: none  Slow fractionated injection: yes  Hemodynamics: stable  Outcomes: uncomplicated and patient tolerated procedure well    Additional Notes  4MG DECADRON ADDED TO LOCAL ANESTHETIC SOLUTION  Medications Administered  lidocaine injection 2% - Perineural   10 mL - 6/12/2024 2:19:00 PM  ropivacaine (NAROPIN) injection 0.5% - Perineural   10 mL - 6/12/2024 2:19:00 PM

## 2024-06-12 NOTE — H&P
ORTHOPEDIC PREOP HISTORY AND PHYSICAL      HPI / Chief Complaint  Bela Kelley is a 58 y.o. female who presents for left elbow pain    Past Medical History  Bela  has a past medical history of ASHD (arteriosclerotic heart disease), CHF (congestive heart failure) (HCC), Hyperlipidemia, Hypertension, Pericardial effusion, Raynaud disease, and Thyroid disease.    Past Surgical History  Bela  has a past surgical history that includes Finger surgery (2019) and Cardiac catheterization.    Current Medications  Current Facility-Administered Medications   Medication Dose Route Frequency Provider Last Rate Last Admin    ceFAZolin (ANCEF) 2,000 mg in sodium chloride 0.9 % 50 mL IVPB (mini-bag)  2,000 mg IntraVENous On Call to OR Alma Perdomo APRN - CNP        dexAMETHasone (PF) (DECADRON) injection 10 mg  10 mg IntraVENous Once Alma Perdomo APRN - CNP        sodium chloride flush 0.9 % injection 5-40 mL  5-40 mL IntraVENous 2 times per day Alma Perdomo APRN - CNP        sodium chloride flush 0.9 % injection 5-40 mL  5-40 mL IntraVENous PRN Alma Perdomo APRN - CNP        lidocaine PF 1 % injection 1 mL  1 mL IntraDERmal Once PRN Sandy Solomon MD        lactated ringers IV soln infusion   IntraVENous Continuous Sandy Solomon MD        sodium chloride flush 0.9 % injection 5-40 mL  5-40 mL IntraVENous 2 times per day Sandy Solomon MD        sodium chloride flush 0.9 % injection 5-40 mL  5-40 mL IntraVENous PRN Sandy Solomon MD        0.9 % sodium chloride infusion   IntraVENous PRN Sandy Solomon MD        ceFAZolin (ANCEF) 2 g injection                Allergies  Allergies have been reviewed.  Bela is allergic to latex, meperidine, and meperidine hcl.    Social History  Bela  reports that she has been smoking cigarettes. She has never used smokeless tobacco. She reports current drug use. Drug: Marijuana (Weed).    Family History  Bela's family history is not on file.      Review of Systems   History obtained

## 2024-06-13 LAB
EKG ATRIAL RATE: 70 BPM
EKG P-R INTERVAL: 160 MS
EKG Q-T INTERVAL: 384 MS
EKG QRS DURATION: 66 MS
EKG QTC CALCULATION (BAZETT): 414 MS
EKG R AXIS: 21 DEGREES
EKG T AXIS: 165 DEGREES
EKG VENTRICULAR RATE: 70 BPM

## 2024-06-14 PROBLEM — S52.022A DISPLACED FRACTURE OF OLECRANON PROCESS WITHOUT INTRAARTICULAR EXTENSION OF LEFT ULNA, INITIAL ENCOUNTER FOR CLOSED FRACTURE: Status: ACTIVE | Noted: 2024-06-14

## 2024-06-14 NOTE — ANESTHESIA POSTPROCEDURE EVALUATION
Department of Anesthesiology  Postprocedure Note    Patient: Bela Kelley  MRN: [id number]  YOB: 1965  Date of evaluation: 6/14/2024    Procedure Summary       Date: 06/12/24 Room / Location: [address]    Anesthesia Start: 1443 Anesthesia Stop: 1623    Procedure: LEFT OLECRANON OPEN REDUCTION INTERNAL FIXATION WITH SYNTHES (Left) Diagnosis:       Closed fracture of olecranon process of left ulna, initial encounter      (Closed fracture of olecranon process of left ulna, initial encounter [S52.022A])    Surgeons: Riki Sotelo MD Responsible Provider: Tima Zhao MD    Anesthesia Type: general, regional ASA Status: 2            Anesthesia Type: No value filed.    Aamir Phase I: Aamir Score: 10    Aamir Phase II: Aamir Score: 10    Anesthesia Post Evaluation    Patient location during evaluation: PACU  Patient participation: complete - patient participated  Level of consciousness: awake and alert  Airway patency: patent  Nausea & Vomiting: no nausea and no vomiting  Cardiovascular status: hemodynamically stable  Respiratory status: room air and spontaneous ventilation  Hydration status: euvolemic  Multimodal analgesia pain management approach  Pain management: adequate    No notable events documented.

## 2024-06-27 ENCOUNTER — OFFICE VISIT (OUTPATIENT)
Age: 59
End: 2024-06-27

## 2024-06-27 VITALS — WEIGHT: 130 LBS | BODY MASS INDEX: 23.92 KG/M2 | HEIGHT: 62 IN

## 2024-06-27 DIAGNOSIS — S52.022A DISPLACED FRACTURE OF OLECRANON PROCESS WITHOUT INTRAARTICULAR EXTENSION OF LEFT ULNA, INITIAL ENCOUNTER FOR CLOSED FRACTURE: Primary | ICD-10-CM

## 2024-06-27 PROCEDURE — 99024 POSTOP FOLLOW-UP VISIT: CPT | Performed by: ORTHOPAEDIC SURGERY

## 2024-06-27 NOTE — PROGRESS NOTES
CHF (congestive heart failure) (HCC)     chronic    Hyperlipidemia     Hypertension     Pericardial effusion 2022    and 2019- small    Raynaud disease     Thyroid disease      Past Surgical History:   Procedure Laterality Date    CARDIAC CATHETERIZATION      ELBOW SURGERY Left 6/12/2024    LEFT OLECRANON OPEN REDUCTION INTERNAL FIXATION WITH SYNTHES performed by Riki Sotelo MD at [facility] OR    FINGER SURGERY  2019     History reviewed. No pertinent family history.       Electronically signed by Riki Sotelo MD on 6/27/2024 at 5:10 PM     Please note that this chart was generated using voice recognition Dragon dictation software.  Although every effort was made to ensure the accuracy of this automated transcription, some errors in transcription may have occurred.

## 2024-07-01 RX ORDER — POTASSIUM CHLORIDE 1500 MG/1
TABLET, EXTENDED RELEASE ORAL
Qty: 2 TABLET | Refills: 0 | OUTPATIENT
Start: 2024-07-01

## 2024-07-25 ENCOUNTER — OFFICE VISIT (OUTPATIENT)
Age: 59
End: 2024-07-25

## 2024-07-25 VITALS — WEIGHT: 130 LBS | HEIGHT: 62 IN | BODY MASS INDEX: 23.92 KG/M2

## 2024-07-25 DIAGNOSIS — S52.022A DISPLACED FRACTURE OF OLECRANON PROCESS WITHOUT INTRAARTICULAR EXTENSION OF LEFT ULNA, INITIAL ENCOUNTER FOR CLOSED FRACTURE: Primary | ICD-10-CM

## 2024-07-25 PROCEDURE — 99024 POSTOP FOLLOW-UP VISIT: CPT | Performed by: ORTHOPAEDIC SURGERY

## 2024-07-25 NOTE — PROGRESS NOTES
[facility] Orthopedics & Sports Medicine      [facility] PHYSICIANS [facility], [facility]  [facility] ORTHOPAEDICS AND SPORTS MEDICINE  [address]  [place] [address]  Dept: [phone]  Dept Fax: [phone]    Chief Compliant:  Chief Complaint   Patient presents with    post operative     Left olecranon ORIF        History of Present Illness:  This is a pleasant 59 y.o. female who is now 6 weeks status post left elbow olecranon fracture ORIF.  She is doing very well.  She has no pain.  She is back to normal activities.    Physical Exam: The left elbow incision is well-healed.  She has full range of motion.  There is no tenderness.    Imaging: X-rays of the left elbow 2 views taken today show excellent healing of the olecranon fracture.  There is been no significant displacement and no hardware failure.      Assessment and Plan:    This is a pleasant 59 y.o. female who is status post the above.  She is doing very well.  Activity as tolerated follow-up as needed         Past History:    Current Outpatient Medications:     potassium chloride (KLOR-CON M) 20 MEQ extended release tablet, Take 2 tablets by mouth daily for 1 dose, Disp: 2 tablet, Rfl: 0    ibuprofen (ADVIL;MOTRIN) 800 MG tablet, Take 1 tablet by mouth 3 times daily (with meals), Disp: 90 tablet, Rfl: 0    albuterol (PROVENTIL) (2.5 MG/3ML) 0.083% nebulizer solution, Inhale 3 mL every day by nebulization route as needed., Disp: , Rfl:     amLODIPine (NORVASC) 2.5 MG tablet, , Disp: , Rfl:     aspirin 81 MG EC tablet, Take 1 tablet by mouth daily, Disp: , Rfl:     atorvastatin (LIPITOR) 80 MG tablet, Take 1 tablet by mouth daily, Disp: , Rfl:     Cholecalciferol (VITAMIN D) 10 MCG (400 UNIT) CAPS Capsule, Take 1,000 Int'l Units by mouth, Disp: , Rfl:     doxycycline hyclate (VIBRAMYCIN) 100 MG capsule, , Disp: , Rfl:     hydroxychloroquine (PLAQUENIL) 200 MG tablet, one tablet daily, Disp: , Rfl:     hydrOXYzine HCl (ATARAX)

## (undated) DEVICE — 60-7070-103 TRNQT,DPSB,PLC RED: Brand: MEDLINE RENEWAL

## (undated) DEVICE — BNDG,ELSTC,MATRIX,STRL,4"X5YD,LF,HOOK&LP: Brand: MEDLINE

## (undated) DEVICE — LIQUIBAND RAPID ADHESIVE 36/CS 0.8ML: Brand: MEDLINE

## (undated) DEVICE — ELECTRODE PT RET AD L9FT HI MOIST COND ADH HYDRGEL CORDED

## (undated) DEVICE — ELECTRODE ES L3IN S STL BLDE INSUL DISP VALLEYLAB EDGE

## (undated) DEVICE — APPLICATOR MEDICATED 26 CC SOLUTION HI LT ORNG CHLORAPREP

## (undated) DEVICE — BANDAGE COBAN 4 IN COMPR W4INXL5YD FOAM COHESIVE QUIK STK SELF ADH SFT

## (undated) DEVICE — STOCKINETTE,IMPERVIOUS,12X48,STERILE: Brand: MEDLINE

## (undated) DEVICE — IMMOBILIZER SHLDR M ENV L15IN D8IN POLY COT CLIN SLNG W/

## (undated) DEVICE — GLOVE SURG SZ 8 L12IN THK75MIL DK GRN LTX FREE

## (undated) DEVICE — SUTURE MONOCRYL SZ 3 0 L18IN ABSRB UD PS 2 3 8 CIR REV CUT NDL MCP497G

## (undated) DEVICE — COVER,TABLE,HEAVY DUTY,50"X90",STRL: Brand: MEDLINE

## (undated) DEVICE — GLOVE SURG SZ 65 THK91MIL LTX FREE SYN POLYISOPRENE

## (undated) DEVICE — SCREW BNE L24MM DIA3.5MM CORT S STL ST NONCANNULATED LOK
Type: IMPLANTABLE DEVICE | Site: ELBOW | Status: NON-FUNCTIONAL
Removed: 2024-06-12

## (undated) DEVICE — BIT DRL L180MM DIA2.5MM G QUIK CPL W/O STP REUSE

## (undated) DEVICE — POUCH INSTR W6.75XL11.5IN FRST 2 PKT ADH FOR ORTH AND

## (undated) DEVICE — DRAPE SURG W41XL74IN CLR FULL SZ C ARM 3 ADH POLY STRP E

## (undated) DEVICE — BLANKET WRM W40.2XL55.9IN IORT LO BODY + MISTRAL AIR

## (undated) DEVICE — TOWEL,OR,DSP,ST,BLUE,DLX,XR,4/PK,20PK/CS: Brand: MEDLINE

## (undated) DEVICE — MHPB GEN MINOR PACK: Brand: MEDLINE INDUSTRIES, INC.

## (undated) DEVICE — BIT DRL L165MM DIA2.8MM QUIK CPL W/O STP REUSE

## (undated) DEVICE — GLOVE ORANGE PI 8   MSG9080

## (undated) DEVICE — YANKAUER,FLEXIBLE HANDLE,REGLR CAPACITY: Brand: MEDLINE INDUSTRIES, INC.

## (undated) DEVICE — SOLUTION IRRIG 1000ML 0.9% SOD CHL USP POUR PLAS BTL

## (undated) DEVICE — GLOVE ORANGE PI 7   MSG9070

## (undated) DEVICE — DRAPE,REIN 53X77,STERILE: Brand: MEDLINE

## (undated) DEVICE — GOWN,SIRUS,NONRNF,SETINSLV,XL,20/CS: Brand: MEDLINE

## (undated) DEVICE — BANDAGE,ELASTIC,ESMARK,STERILE,4"X9',LF: Brand: MEDLINE

## (undated) DEVICE — DRAPE,U/ SHT,SPLIT,PLAS,STERIL: Brand: MEDLINE

## (undated) DEVICE — SCREW BNE L24MM DIA2.7MM ANK S STL ST VAR ANG LOK FULL THRD
Type: IMPLANTABLE DEVICE | Site: ELBOW | Status: NON-FUNCTIONAL
Removed: 2024-06-12

## (undated) DEVICE — GAUZE,SPONGE,4"X4",16PLY,STRL,LF,10/TRAY: Brand: MEDLINE

## (undated) DEVICE — C-ARMOR C-ARM EQUIPMENT COVERS CLEAR STERILE UNIVERSAL FIT 12 PER CASE: Brand: C-ARMOR

## (undated) DEVICE — SUTURE VICRYL + SZ 3-0 L36IN ABSRB UD L36MM CT-1 1/2 CIR VCP944H

## (undated) DEVICE — GLOVE SURG SZ 65 L12IN THK75MIL DK GRN LTX FREE

## (undated) DEVICE — SUTURE VICRYL SZ 0 L36IN ABSRB UD CT-1 L36MM 1/2 CIR TAPR PNT VCP946H

## (undated) DEVICE — SHEET, ORTHO, SPLIT, STERILE: Brand: MEDLINE